# Patient Record
Sex: FEMALE | Race: WHITE | NOT HISPANIC OR LATINO | Employment: FULL TIME | URBAN - METROPOLITAN AREA
[De-identification: names, ages, dates, MRNs, and addresses within clinical notes are randomized per-mention and may not be internally consistent; named-entity substitution may affect disease eponyms.]

---

## 2019-01-29 ENCOUNTER — HISTORICAL (OUTPATIENT)
Dept: ADMINISTRATIVE | Facility: HOSPITAL | Age: 29
End: 2019-01-29

## 2019-01-29 LAB
ABS NEUT (OLG): 3.6 X10(3)/MCL (ref 2.1–9.2)
ALBUMIN SERPL-MCNC: 4.1 GM/DL (ref 3.4–5)
ALBUMIN/GLOB SERPL: 1.78 {RATIO} (ref 1.5–2.5)
ALP SERPL-CCNC: 56 UNIT/L (ref 38–126)
ALT SERPL-CCNC: 12 UNIT/L (ref 7–52)
APPEARANCE, UA: CLEAR
AST SERPL-CCNC: 16 UNIT/L (ref 15–37)
BACTERIA #/AREA URNS AUTO: NORMAL /HPF
BILIRUB SERPL-MCNC: 0.3 MG/DL (ref 0.2–1)
BILIRUB UR QL STRIP: NEGATIVE MG/DL
BILIRUBIN DIRECT+TOT PNL SERPL-MCNC: 0.1 MG/DL (ref 0–0.5)
BILIRUBIN DIRECT+TOT PNL SERPL-MCNC: 0.2 MG/DL
BUN SERPL-MCNC: 7 MG/DL (ref 7–18)
CALCIUM SERPL-MCNC: 8.4 MG/DL (ref 8.5–10)
CHLORIDE SERPL-SCNC: 96 MMOL/L (ref 98–107)
CO2 SERPL-SCNC: 29 MMOL/L (ref 21–32)
COLOR UR: YELLOW
CREAT SERPL-MCNC: 0.65 MG/DL (ref 0.6–1.3)
ERYTHROCYTE [DISTWIDTH] IN BLOOD BY AUTOMATED COUNT: 12.3 % (ref 11.5–17)
GLOBULIN SER-MCNC: 2.3 GM/DL (ref 1.2–3)
GLUCOSE (UA): NEGATIVE MG/DL
GLUCOSE SERPL-MCNC: 85 MG/DL (ref 74–106)
HCT VFR BLD AUTO: 39.8 % (ref 37–47)
HGB BLD-MCNC: 13.1 GM/DL (ref 12–16)
HGB UR QL STRIP: NEGATIVE UNIT/L
KETONES UR QL STRIP: NEGATIVE MG/DL
LEUKOCYTE ESTERASE UR QL STRIP: NEGATIVE UNIT/L
LYMPHOCYTES # BLD AUTO: 2 X10(3)/MCL (ref 0.6–3.4)
LYMPHOCYTES NFR BLD AUTO: 32.3 % (ref 13–40)
MCH RBC QN AUTO: 32.3 PG (ref 27–31.2)
MCHC RBC AUTO-ENTMCNC: 33 GM/DL (ref 32–36)
MCV RBC AUTO: 98 FL (ref 80–94)
MONOCYTES # BLD AUTO: 0.6 X10(3)/MCL (ref 0.1–1.3)
MONOCYTES NFR BLD AUTO: 9 % (ref 0.1–24)
NEUTROPHILS NFR BLD AUTO: 58.7 % (ref 47–80)
NITRITE UR QL STRIP.AUTO: NEGATIVE
PH UR STRIP: 7.5 [PH]
PLATELET # BLD AUTO: 262 X10(3)/MCL (ref 130–400)
PMV BLD AUTO: 8.1 FL (ref 9.4–12.4)
POTASSIUM SERPL-SCNC: 3.7 MMOL/L (ref 3.5–5.1)
PROT SERPL-MCNC: 6.4 GM/DL (ref 6.4–8.2)
PROT UR QL STRIP: NEGATIVE MG/DL
RBC # BLD AUTO: 4.05 X10(6)/MCL (ref 4.2–5.4)
RBC #/AREA URNS HPF: NORMAL /HPF
SODIUM SERPL-SCNC: 128 MMOL/L (ref 136–145)
SP GR UR STRIP: 1.01
SQUAMOUS EPITHELIAL, UA: NORMAL /LPF
UROBILINOGEN UR STRIP-ACNC: 0.2 MG/DL
WBC # SPEC AUTO: 6.2 X10(3)/MCL (ref 4.5–11.5)
WBC #/AREA URNS AUTO: NORMAL /[HPF]

## 2019-02-20 ENCOUNTER — HISTORICAL (OUTPATIENT)
Dept: ADMINISTRATIVE | Facility: HOSPITAL | Age: 29
End: 2019-02-20

## 2019-02-20 LAB — H PYLORI AB SER IA-ACNC: NEGATIVE

## 2019-05-06 ENCOUNTER — HISTORICAL (OUTPATIENT)
Dept: LAB | Facility: HOSPITAL | Age: 29
End: 2019-05-06

## 2019-05-22 ENCOUNTER — HISTORICAL (OUTPATIENT)
Dept: ADMINISTRATIVE | Facility: HOSPITAL | Age: 29
End: 2019-05-22

## 2019-05-22 LAB
ABS NEUT (OLG): 6.23 X10(3)/MCL (ref 2.1–9.2)
BASOPHILS # BLD AUTO: 0 X10(3)/MCL (ref 0–0.2)
BASOPHILS NFR BLD AUTO: 0 %
EOSINOPHIL # BLD AUTO: 0 X10(3)/MCL (ref 0–0.9)
EOSINOPHIL NFR BLD AUTO: 0 %
ERYTHROCYTE [DISTWIDTH] IN BLOOD BY AUTOMATED COUNT: 11.8 % (ref 11.5–17)
GROUP & RH: NORMAL
HBV SURFACE AG SERPL QL IA: NEGATIVE
HCT VFR BLD AUTO: 40.7 % (ref 37–47)
HGB BLD-MCNC: 13.5 GM/DL (ref 12–16)
HIV 1+2 AB+HIV1 P24 AG SERPL QL IA: NEGATIVE
LYMPHOCYTES # BLD AUTO: 1.6 X10(3)/MCL (ref 0.6–4.6)
LYMPHOCYTES NFR BLD AUTO: 19 %
MCH RBC QN AUTO: 31.2 PG (ref 27–31)
MCHC RBC AUTO-ENTMCNC: 33.2 GM/DL (ref 33–36)
MCV RBC AUTO: 94 FL (ref 80–94)
MONOCYTES # BLD AUTO: 0.6 X10(3)/MCL (ref 0.1–1.3)
MONOCYTES NFR BLD AUTO: 7 %
NEUTROPHILS # BLD AUTO: 6.23 X10(3)/MCL (ref 2.1–9.2)
NEUTROPHILS NFR BLD AUTO: 73 %
PLATELET # BLD AUTO: 251 X10(3)/MCL (ref 130–400)
PMV BLD AUTO: 9 FL (ref 9.4–12.4)
RBC # BLD AUTO: 4.33 X10(6)/MCL (ref 4.2–5.4)
T PALLIDUM AB SER QL: NORMAL
WBC # SPEC AUTO: 8.6 X10(3)/MCL (ref 4.5–11.5)

## 2019-05-24 LAB — FINAL CULTURE: NO GROWTH

## 2019-08-12 ENCOUNTER — HISTORICAL (OUTPATIENT)
Dept: ADMINISTRATIVE | Facility: HOSPITAL | Age: 29
End: 2019-08-12

## 2019-08-12 LAB
ERYTHROCYTE [DISTWIDTH] IN BLOOD BY AUTOMATED COUNT: 12.9 % (ref 11.5–17)
GLUCOSE 1H P 100 G GLC PO SERPL-MCNC: 105 MG/DL (ref 100–180)
HCT VFR BLD AUTO: 38.6 % (ref 37–47)
HGB BLD-MCNC: 12.8 GM/DL (ref 12–16)
MCH RBC QN AUTO: 31.7 PG (ref 27–31)
MCHC RBC AUTO-ENTMCNC: 33.2 GM/DL (ref 33–36)
MCV RBC AUTO: 95.5 FL (ref 80–94)
PLATELET # BLD AUTO: 226 X10(3)/MCL (ref 130–400)
PMV BLD AUTO: 9.1 FL (ref 9.4–12.4)
PRODUCT READY: NORMAL
RBC # BLD AUTO: 4.04 X10(6)/MCL (ref 4.2–5.4)
WBC # SPEC AUTO: 7.3 X10(3)/MCL (ref 4.5–11.5)

## 2019-11-05 ENCOUNTER — HISTORICAL (OUTPATIENT)
Dept: LAB | Facility: HOSPITAL | Age: 29
End: 2019-11-05

## 2019-11-07 LAB — FINAL CULTURE: NORMAL

## 2020-04-29 LAB
PAP RECOMMENDATION EXT: NORMAL
PAP SMEAR: NORMAL

## 2022-04-07 ENCOUNTER — HISTORICAL (OUTPATIENT)
Dept: ADMINISTRATIVE | Facility: HOSPITAL | Age: 32
End: 2022-04-07

## 2022-04-24 VITALS
WEIGHT: 140.44 LBS | DIASTOLIC BLOOD PRESSURE: 70 MMHG | HEIGHT: 63 IN | BODY MASS INDEX: 24.88 KG/M2 | SYSTOLIC BLOOD PRESSURE: 106 MMHG

## 2022-09-15 ENCOUNTER — DOCUMENTATION ONLY (OUTPATIENT)
Dept: ADMINISTRATIVE | Facility: HOSPITAL | Age: 32
End: 2022-09-15

## 2023-09-20 PROBLEM — F41.9 ANXIETY AND DEPRESSION: Status: ACTIVE | Noted: 2023-09-20

## 2023-09-20 PROBLEM — F32.A ANXIETY AND DEPRESSION: Status: ACTIVE | Noted: 2023-09-20

## 2023-10-31 PROBLEM — F33.9 DEPRESSION, RECURRENT: Status: ACTIVE | Noted: 2023-10-31

## 2024-11-13 ENCOUNTER — OFFICE VISIT (OUTPATIENT)
Dept: PRIMARY CARE CLINIC | Facility: CLINIC | Age: 34
End: 2024-11-13
Payer: COMMERCIAL

## 2024-11-13 DIAGNOSIS — Z00.00 WELLNESS EXAMINATION: Chronic | ICD-10-CM

## 2024-11-13 DIAGNOSIS — F33.9 DEPRESSION, RECURRENT: Chronic | ICD-10-CM

## 2024-11-13 DIAGNOSIS — R50.9 LOW GRADE FEVER: ICD-10-CM

## 2024-11-13 DIAGNOSIS — R53.83 FATIGUE, UNSPECIFIED TYPE: ICD-10-CM

## 2024-11-13 DIAGNOSIS — Z76.89 ENCOUNTER TO ESTABLISH CARE WITH NEW DOCTOR: Primary | ICD-10-CM

## 2024-11-13 DIAGNOSIS — L65.9 HAIR LOSS: ICD-10-CM

## 2024-11-13 PROBLEM — F41.9 ANXIETY AND DEPRESSION: Status: RESOLVED | Noted: 2023-09-20 | Resolved: 2024-11-13

## 2024-11-13 PROBLEM — F32.A ANXIETY AND DEPRESSION: Status: RESOLVED | Noted: 2023-09-20 | Resolved: 2024-11-13

## 2024-11-13 PROCEDURE — 99204 OFFICE O/P NEW MOD 45 MIN: CPT | Mod: ,,, | Performed by: STUDENT IN AN ORGANIZED HEALTH CARE EDUCATION/TRAINING PROGRAM

## 2024-11-13 PROCEDURE — 1160F RVW MEDS BY RX/DR IN RCRD: CPT | Mod: CPTII,,, | Performed by: STUDENT IN AN ORGANIZED HEALTH CARE EDUCATION/TRAINING PROGRAM

## 2024-11-13 PROCEDURE — 1159F MED LIST DOCD IN RCRD: CPT | Mod: CPTII,,, | Performed by: STUDENT IN AN ORGANIZED HEALTH CARE EDUCATION/TRAINING PROGRAM

## 2024-11-13 RX ORDER — SERTRALINE HYDROCHLORIDE 50 MG/1
75 TABLET, FILM COATED ORAL DAILY
Qty: 135 TABLET | Refills: 3 | Status: SHIPPED | OUTPATIENT
Start: 2024-11-13 | End: 2025-11-13

## 2024-11-13 RX ORDER — SERTRALINE HYDROCHLORIDE 50 MG/1
75 TABLET, FILM COATED ORAL DAILY
Qty: 135 TABLET | Refills: 3 | Status: SHIPPED | OUTPATIENT
Start: 2024-11-13 | End: 2024-11-13

## 2024-11-13 NOTE — PROGRESS NOTES
Subjective:       Patient ID: India Medina is a 34 y.o. female.    -------------------------------------    Abdominal pain    Anxiety disorder, unspecified    Rh negative state in antepartum period      Chief Complaint: Establish Care    Presents to the clinic to establish care. Due for wellness visit. Overall been healthy. However for these past two weeks have been not feeling too well. Having pelvic pain,radiating to rectum, lower back and into the legs. Been having muscle aches and low grade fever. No dysuria, hematuria, urinary frequency/urgency. Reached out to OBGYN but was unable to be seen so was treated prophylactic or UTI with Macrobid. Denies constipation, bloody stools and pain defecation. Had a bout of diarrhea/loose stools yesterday. History of hemorrhoids but this was different. Pelvic pain and rectal issues resolved with antibiotics. Daughter was sick 1.5 week ago and now her son is sick as well. Both had sinus issues/upper respiratory symptoms with fever.       Review of Systems   Constitutional:  Positive for fatigue and fever.   HENT:  Negative for nasal congestion, rhinorrhea and sinus pressure/congestion.    Respiratory:  Negative for cough.    Cardiovascular:  Negative for chest pain and leg swelling.   Gastrointestinal:  Positive for abdominal pain and rectal pain.   Genitourinary:  Positive for pelvic pain. Negative for dysuria, frequency, hematuria and urgency.   Musculoskeletal:  Positive for myalgias.   Psychiatric/Behavioral:  Negative for depressed mood.          Objective:      Physical Exam  Vitals and nursing note reviewed.   Constitutional:       General: She is not in acute distress.     Appearance: Normal appearance. She is not ill-appearing.   HENT:      Right Ear: Tympanic membrane and external ear normal. No middle ear effusion. Tympanic membrane is not erythematous.      Left Ear: Tympanic membrane and external ear normal.  No middle ear effusion. Tympanic membrane is not  erythematous.      Nose: Congestion and rhinorrhea present.      Right Turbinates: Enlarged and swollen.      Left Turbinates: Enlarged and swollen.      Mouth/Throat:      Mouth: Mucous membranes are moist.      Pharynx: No posterior oropharyngeal erythema.   Eyes:      General: No scleral icterus.     Conjunctiva/sclera: Conjunctivae normal.   Cardiovascular:      Rate and Rhythm: Normal rate and regular rhythm.      Pulses: Normal pulses.      Heart sounds: Normal heart sounds. No murmur heard.  Pulmonary:      Effort: Pulmonary effort is normal. No respiratory distress.      Breath sounds: Normal breath sounds. No wheezing.   Abdominal:      Palpations: Abdomen is soft.      Tenderness: There is no abdominal tenderness.   Musculoskeletal:         General: No deformity.      Right lower leg: No edema.      Left lower leg: No edema.   Skin:     General: Skin is warm.      Coloration: Skin is not jaundiced.   Neurological:      Mental Status: She is alert. Mental status is at baseline.      Gait: Gait normal.   Psychiatric:         Mood and Affect: Mood normal.         Behavior: Behavior normal.         Assessment & Plan:   1. Encounter to establish care with new doctor  Comments:  Reviewed medical history and reconciled medications   Ordered Wellness Labs   Requested records from previous provider/specialists  Orders:  -     CBC Auto Differential; Future; Expected date: 11/13/2024  -     Comprehensive Metabolic Panel; Future; Expected date: 11/13/2024  -     TSH; Future; Expected date: 11/13/2024  -     Lipid Panel; Future; Expected date: 11/13/2024  -     Urinalysis; Future; Expected date: 11/13/2024  -     Hemoglobin A1C; Future; Expected date: 11/13/2024  -     Vitamin D; Future; Expected date: 11/13/2024  -     T4, Free; Future; Expected date: 11/13/2024  -     Iron and TIBC; Future; Expected date: 11/13/2024  -     Vitamin B12; Future; Expected date: 11/13/2024  -     Folate; Future; Expected date:  11/13/2024  -     Ferritin; Future; Expected date: 11/13/2024    2. Depression, recurrent  Assessment & Plan:  Stable, improved   Continue Sertraline, refilled  Encouraged self coping mechanisms (deep breathing, exercise, yoga, meditation, etc)       Orders:  -     CBC Auto Differential; Future; Expected date: 11/13/2024  -     Comprehensive Metabolic Panel; Future; Expected date: 11/13/2024  -     TSH; Future; Expected date: 11/13/2024  -     Lipid Panel; Future; Expected date: 11/13/2024  -     Urinalysis; Future; Expected date: 11/13/2024  -     Hemoglobin A1C; Future; Expected date: 11/13/2024  -     sertraline (ZOLOFT) 50 MG tablet; Take 1.5 tablets (75 mg total) by mouth once daily.  Dispense: 135 tablet; Refill: 3    3. Wellness examination  -     CBC Auto Differential; Future; Expected date: 11/13/2024  -     Comprehensive Metabolic Panel; Future; Expected date: 11/13/2024  -     TSH; Future; Expected date: 11/13/2024  -     Lipid Panel; Future; Expected date: 11/13/2024  -     Urinalysis; Future; Expected date: 11/13/2024  -     Hemoglobin A1C; Future; Expected date: 11/13/2024  -     Vitamin D; Future; Expected date: 11/13/2024  -     T4, Free; Future; Expected date: 11/13/2024  -     Iron and TIBC; Future; Expected date: 11/13/2024  -     Vitamin B12; Future; Expected date: 11/13/2024  -     Folate; Future; Expected date: 11/13/2024  -     Ferritin; Future; Expected date: 11/13/2024    4. Low grade fever  Comments:  Symptom improving, 2 weeks   POC COVID - likely viral given children symptoms as well   If flu, not a candidate for tamiflu given symptom onset >48-72 hours  Orders:  -     SARS Coronavirus 2 Antigen, POCT Manual Read    5. Fatigue, unspecified type  -     TSH; Future; Expected date: 11/13/2024  -     Vitamin D; Future; Expected date: 11/13/2024  -     T4, Free; Future; Expected date: 11/13/2024  -     Iron and TIBC; Future; Expected date: 11/13/2024  -     Vitamin B12; Future; Expected date:  11/13/2024  -     Folate; Future; Expected date: 11/13/2024  -     Ferritin; Future; Expected date: 11/13/2024  -     BEL Screen IFA; Future; Expected date: 11/13/2024    6. Hair loss  -     TSH; Future; Expected date: 11/13/2024  -     Vitamin D; Future; Expected date: 11/13/2024  -     T4, Free; Future; Expected date: 11/13/2024  -     Iron and TIBC; Future; Expected date: 11/13/2024  -     Vitamin B12; Future; Expected date: 11/13/2024  -     Folate; Future; Expected date: 11/13/2024  -     Ferritin; Future; Expected date: 11/13/2024  -     BEL Screen IFA; Future; Expected date: 11/13/2024    Follow up in about 2 weeks (around 11/27/2024) for Wellness. In addition to their scheduled follow up, the patient has also been instructed to follow up on as needed basis.

## 2024-11-13 NOTE — ASSESSMENT & PLAN NOTE
Stable, improved   Continue Sertraline, refilled  Encouraged self coping mechanisms (deep breathing, exercise, yoga, meditation, etc)

## 2024-11-14 ENCOUNTER — LAB VISIT (OUTPATIENT)
Dept: LAB | Facility: HOSPITAL | Age: 34
End: 2024-11-14
Attending: STUDENT IN AN ORGANIZED HEALTH CARE EDUCATION/TRAINING PROGRAM
Payer: COMMERCIAL

## 2024-11-14 DIAGNOSIS — L65.9 HAIR LOSS: ICD-10-CM

## 2024-11-14 DIAGNOSIS — Z76.89 ENCOUNTER TO ESTABLISH CARE WITH NEW DOCTOR: ICD-10-CM

## 2024-11-14 DIAGNOSIS — R53.83 FATIGUE, UNSPECIFIED TYPE: ICD-10-CM

## 2024-11-14 DIAGNOSIS — Z00.00 WELLNESS EXAMINATION: ICD-10-CM

## 2024-11-14 DIAGNOSIS — F33.9 DEPRESSION, RECURRENT: ICD-10-CM

## 2024-11-14 LAB
25(OH)D3+25(OH)D2 SERPL-MCNC: 28 NG/ML (ref 30–80)
ALBUMIN SERPL-MCNC: 4.1 G/DL (ref 3.5–5)
ALBUMIN/GLOB SERPL: 1.3 RATIO (ref 1.1–2)
ALP SERPL-CCNC: 72 UNIT/L (ref 40–150)
ALT SERPL-CCNC: 5 UNIT/L (ref 0–55)
ANION GAP SERPL CALC-SCNC: 8 MEQ/L
AST SERPL-CCNC: 11 UNIT/L (ref 5–34)
BACTERIA #/AREA URNS AUTO: ABNORMAL /HPF
BASOPHILS # BLD AUTO: 0.03 X10(3)/MCL
BASOPHILS NFR BLD AUTO: 0.5 %
BILIRUB SERPL-MCNC: 0.4 MG/DL
BILIRUB UR QL STRIP.AUTO: NEGATIVE
BUN SERPL-MCNC: 6.6 MG/DL (ref 7–18.7)
CALCIUM SERPL-MCNC: 9.5 MG/DL (ref 8.4–10.2)
CHLORIDE SERPL-SCNC: 106 MMOL/L (ref 98–107)
CHOLEST SERPL-MCNC: 189 MG/DL
CHOLEST/HDLC SERPL: 4 {RATIO} (ref 0–5)
CLARITY UR: CLEAR
CO2 SERPL-SCNC: 26 MMOL/L (ref 22–29)
COLOR UR AUTO: YELLOW
CREAT SERPL-MCNC: 0.72 MG/DL (ref 0.55–1.02)
CREAT/UREA NIT SERPL: 9
EOSINOPHIL # BLD AUTO: 0.33 X10(3)/MCL (ref 0–0.9)
EOSINOPHIL NFR BLD AUTO: 5.7 %
ERYTHROCYTE [DISTWIDTH] IN BLOOD BY AUTOMATED COUNT: 11.9 % (ref 11.5–17)
EST. AVERAGE GLUCOSE BLD GHB EST-MCNC: 88.2 MG/DL
FERRITIN SERPL-MCNC: 176.1 NG/ML (ref 4.63–204)
FOLATE SERPL-MCNC: 9.9 NG/ML (ref 7–31.4)
GFR SERPLBLD CREATININE-BSD FMLA CKD-EPI: >60 ML/MIN/1.73/M2
GLOBULIN SER-MCNC: 3.1 GM/DL (ref 2.4–3.5)
GLUCOSE SERPL-MCNC: 95 MG/DL (ref 74–100)
GLUCOSE UR QL STRIP: NORMAL
HBA1C MFR BLD: 4.7 %
HCT VFR BLD AUTO: 40.5 % (ref 37–47)
HDLC SERPL-MCNC: 49 MG/DL (ref 35–60)
HGB BLD-MCNC: 13.6 G/DL (ref 12–16)
HGB UR QL STRIP: ABNORMAL
IMM GRANULOCYTES # BLD AUTO: 0.02 X10(3)/MCL (ref 0–0.04)
IMM GRANULOCYTES NFR BLD AUTO: 0.3 %
IRON SATN MFR SERPL: 36 % (ref 20–50)
IRON SERPL-MCNC: 87 UG/DL (ref 50–170)
KETONES UR QL STRIP: NEGATIVE
LDLC SERPL CALC-MCNC: 125 MG/DL (ref 50–140)
LEUKOCYTE ESTERASE UR QL STRIP: NEGATIVE
LYMPHOCYTES # BLD AUTO: 1.64 X10(3)/MCL (ref 0.6–4.6)
LYMPHOCYTES NFR BLD AUTO: 28.4 %
MCH RBC QN AUTO: 31.9 PG (ref 27–31)
MCHC RBC AUTO-ENTMCNC: 33.6 G/DL (ref 33–36)
MCV RBC AUTO: 94.8 FL (ref 80–94)
MONOCYTES # BLD AUTO: 0.47 X10(3)/MCL (ref 0.1–1.3)
MONOCYTES NFR BLD AUTO: 8.1 %
MUCOUS THREADS URNS QL MICRO: ABNORMAL /LPF
NEUTROPHILS # BLD AUTO: 3.28 X10(3)/MCL (ref 2.1–9.2)
NEUTROPHILS NFR BLD AUTO: 57 %
NITRITE UR QL STRIP: NEGATIVE
NRBC BLD AUTO-RTO: 0 %
PH UR STRIP: 6.5 [PH]
PLATELET # BLD AUTO: 322 X10(3)/MCL (ref 130–400)
PMV BLD AUTO: 9.3 FL (ref 7.4–10.4)
POTASSIUM SERPL-SCNC: 4.6 MMOL/L (ref 3.5–5.1)
PROT SERPL-MCNC: 7.2 GM/DL (ref 6.4–8.3)
PROT UR QL STRIP: ABNORMAL
RBC # BLD AUTO: 4.27 X10(6)/MCL (ref 4.2–5.4)
RBC #/AREA URNS AUTO: ABNORMAL /HPF
SODIUM SERPL-SCNC: 140 MMOL/L (ref 136–145)
SP GR UR STRIP.AUTO: 1.02 (ref 1–1.03)
SQUAMOUS #/AREA URNS LPF: ABNORMAL /HPF
T4 FREE SERPL-MCNC: 1.05 NG/DL (ref 0.7–1.48)
TIBC SERPL-MCNC: 158 UG/DL (ref 70–310)
TIBC SERPL-MCNC: 245 UG/DL (ref 250–450)
TRANSFERRIN SERPL-MCNC: 209 MG/DL (ref 180–382)
TRIGL SERPL-MCNC: 77 MG/DL (ref 37–140)
TSH SERPL-ACNC: 1.27 UIU/ML (ref 0.35–4.94)
UROBILINOGEN UR STRIP-ACNC: NORMAL
VIT B12 SERPL-MCNC: 1050 PG/ML (ref 213–816)
VLDLC SERPL CALC-MCNC: 15 MG/DL
WBC # BLD AUTO: 5.77 X10(3)/MCL (ref 4.5–11.5)
WBC #/AREA URNS AUTO: ABNORMAL /HPF

## 2024-11-14 PROCEDURE — 82306 VITAMIN D 25 HYDROXY: CPT

## 2024-11-14 PROCEDURE — 84443 ASSAY THYROID STIM HORMONE: CPT

## 2024-11-14 PROCEDURE — 80061 LIPID PANEL: CPT

## 2024-11-14 PROCEDURE — 80053 COMPREHEN METABOLIC PANEL: CPT

## 2024-11-14 PROCEDURE — 81015 MICROSCOPIC EXAM OF URINE: CPT

## 2024-11-14 PROCEDURE — 82728 ASSAY OF FERRITIN: CPT

## 2024-11-14 PROCEDURE — 82746 ASSAY OF FOLIC ACID SERUM: CPT

## 2024-11-14 PROCEDURE — 86039 ANTINUCLEAR ANTIBODIES (ANA): CPT

## 2024-11-14 PROCEDURE — 83036 HEMOGLOBIN GLYCOSYLATED A1C: CPT

## 2024-11-14 PROCEDURE — 83540 ASSAY OF IRON: CPT

## 2024-11-14 PROCEDURE — 82607 VITAMIN B-12: CPT

## 2024-11-14 PROCEDURE — 36415 COLL VENOUS BLD VENIPUNCTURE: CPT

## 2024-11-14 PROCEDURE — 84439 ASSAY OF FREE THYROXINE: CPT

## 2024-11-14 PROCEDURE — 85025 COMPLETE CBC W/AUTO DIFF WBC: CPT

## 2024-11-15 LAB — ANA SER QL HEP2 SUBST: NORMAL

## 2024-11-21 ENCOUNTER — OFFICE VISIT (OUTPATIENT)
Dept: PRIMARY CARE CLINIC | Facility: CLINIC | Age: 34
End: 2024-11-21
Payer: COMMERCIAL

## 2024-11-21 VITALS
DIASTOLIC BLOOD PRESSURE: 74 MMHG | HEIGHT: 62 IN | OXYGEN SATURATION: 97 % | SYSTOLIC BLOOD PRESSURE: 113 MMHG | TEMPERATURE: 98 F | HEART RATE: 92 BPM | WEIGHT: 132 LBS | BODY MASS INDEX: 24.29 KG/M2 | RESPIRATION RATE: 20 BRPM

## 2024-11-21 DIAGNOSIS — Z00.00 WELLNESS EXAMINATION: Primary | ICD-10-CM

## 2024-11-21 PROCEDURE — 3078F DIAST BP <80 MM HG: CPT | Mod: CPTII,,, | Performed by: STUDENT IN AN ORGANIZED HEALTH CARE EDUCATION/TRAINING PROGRAM

## 2024-11-21 PROCEDURE — 3008F BODY MASS INDEX DOCD: CPT | Mod: CPTII,,, | Performed by: STUDENT IN AN ORGANIZED HEALTH CARE EDUCATION/TRAINING PROGRAM

## 2024-11-21 PROCEDURE — 1159F MED LIST DOCD IN RCRD: CPT | Mod: CPTII,,, | Performed by: STUDENT IN AN ORGANIZED HEALTH CARE EDUCATION/TRAINING PROGRAM

## 2024-11-21 PROCEDURE — 3074F SYST BP LT 130 MM HG: CPT | Mod: CPTII,,, | Performed by: STUDENT IN AN ORGANIZED HEALTH CARE EDUCATION/TRAINING PROGRAM

## 2024-11-21 PROCEDURE — 99395 PREV VISIT EST AGE 18-39: CPT | Mod: ,,, | Performed by: STUDENT IN AN ORGANIZED HEALTH CARE EDUCATION/TRAINING PROGRAM

## 2024-11-21 PROCEDURE — 3044F HG A1C LEVEL LT 7.0%: CPT | Mod: CPTII,,, | Performed by: STUDENT IN AN ORGANIZED HEALTH CARE EDUCATION/TRAINING PROGRAM

## 2024-11-21 PROCEDURE — 1160F RVW MEDS BY RX/DR IN RCRD: CPT | Mod: CPTII,,, | Performed by: STUDENT IN AN ORGANIZED HEALTH CARE EDUCATION/TRAINING PROGRAM

## 2024-11-21 NOTE — ASSESSMENT & PLAN NOTE
Health Maintenance:  Routine Health Maintenance   Exercise as tolerated, >30 minutes a day for 3-5 days a week.  Counseled patient on compliance with medications and healthy diet.     Age-Appropriate Screening  Vaccinations:    - Flu: Postponed, patient's preference   - Pneumonia: N/A   - Shingles (>50):N/A   - Tdap: UTD, due 2029   - COVID: Discontinued, patient's preference    Screening:   - Pap Smear (21-65): UTD, due 2026   - Mammogram (40-50 discuss w/ pt, all >50): N/A   - Osteoporosis (all>65, sooner if risk factors): N/A   - Lung Ca. Screening (50-80 if >20 pack yrs current or quit <15 yrs): N/A   - Colon Ca. Screening (age >45): N/A   - Hep. C, HIV: Negative

## 2024-11-21 NOTE — PROGRESS NOTES
ANNUAL WELLNESS NOTE    Chief Complaint:  No chief complaint on file.     HPI:  India Medina is a 34 y.o. female    -------------------------------------    Abdominal pain    Anxiety disorder, unspecified    Rh negative state in antepartum period     Patient Care Team:  Nikole Arnett MD as PCP - General (Internal Medicine)  Ady Gaspar MD as Consulting Physician (Obstetrics and Gynecology)    Presents today for wellness visit. Describes overall health as good. Diet is balanced. Exercises rarely. Tobacco use: never. Alcohol use: <5 drinks/wk. Caffeine intake: 2-3 caffeinated drinks daily. Eye exam: never (denies vision problems). Dental exam: twice annually. Reviewed labs, answered all questions and concerns at this time.     Review of Systems   Constitutional:  Negative for chills and fever.   Respiratory:  Negative for cough and shortness of breath.    Cardiovascular:  Negative for chest pain and leg swelling.   Gastrointestinal:  Negative for abdominal pain, diarrhea, nausea and vomiting.   Genitourinary:  Negative for dysuria and hematuria.   Psychiatric/Behavioral:  Negative for dysphoric mood and sleep disturbance. The patient is not nervous/anxious.        Physical Exam  Vitals and nursing note reviewed.   Constitutional:       General: She is not in acute distress.     Appearance: Normal appearance. She is not ill-appearing.   HENT:      Head: Normocephalic.      Nose: Nose normal. No rhinorrhea.      Mouth/Throat:      Mouth: Mucous membranes are moist.   Eyes:      General: No scleral icterus.     Conjunctiva/sclera: Conjunctivae normal.   Cardiovascular:      Rate and Rhythm: Normal rate and regular rhythm.   Pulmonary:      Effort: Pulmonary effort is normal. No respiratory distress.   Musculoskeletal:         General: No deformity.   Skin:     General: Skin is warm and dry.      Coloration: Skin is not jaundiced.   Neurological:      Mental Status: She is alert and oriented to  person, place, and time. Mental status is at baseline.      Gait: Gait normal.   Psychiatric:         Mood and Affect: Mood normal.         Behavior: Behavior normal.       Assessment/Plan:  1. Wellness examination  Assessment & Plan:  Health Maintenance:  Routine Health Maintenance   Exercise as tolerated, >30 minutes a day for 3-5 days a week.  Counseled patient on compliance with medications and healthy diet.     Age-Appropriate Screening  Vaccinations:    - Flu: Postponed, patient's preference   - Pneumonia: N/A   - Shingles (>50):N/A   - Tdap: UTD, due 2029   - COVID: Discontinued, patient's preference    Screening:   - Pap Smear (21-65): UTD, due 2026   - Mammogram (40-50 discuss w/ pt, all >50): N/A   - Osteoporosis (all>65, sooner if risk factors): N/A   - Lung Ca. Screening (50-80 if >20 pack yrs current or quit <15 yrs): N/A   - Colon Ca. Screening (age >45): N/A   - Hep. C, HIV: Negative      Follow up in about 1 year (around 11/21/2025) for Wellness.

## 2025-01-24 DIAGNOSIS — F33.9 DEPRESSION, RECURRENT: Chronic | ICD-10-CM

## 2025-01-24 RX ORDER — SERTRALINE HYDROCHLORIDE 50 MG/1
75 TABLET, FILM COATED ORAL DAILY
Qty: 135 TABLET | Refills: 3 | Status: SHIPPED | OUTPATIENT
Start: 2025-01-24 | End: 2026-01-24

## 2025-04-21 ENCOUNTER — DOCUMENTATION ONLY (OUTPATIENT)
Dept: PRIMARY CARE CLINIC | Facility: CLINIC | Age: 35
End: 2025-04-21
Payer: COMMERCIAL

## 2025-04-21 LAB — CRC RECOMMENDATION EXT: NORMAL

## 2025-07-07 ENCOUNTER — TELEPHONE (OUTPATIENT)
Dept: FAMILY MEDICINE | Facility: CLINIC | Age: 35
End: 2025-07-07
Payer: COMMERCIAL

## 2025-07-07 NOTE — TELEPHONE ENCOUNTER
Copied from CRM #[id number]. Topic: Appointments - Appointment Scheduling  >> Jul 7, 2025 12:37 PM Lizbeth wrote:  Type:  Sooner Apoointment Request    Caller is requesting a sooner appointment.  Caller declined first available appointment listed below.  Caller will not accept being placed on the waitlist and is requesting a message be sent to doctor.    Name of Caller: pt    When is the first available appointment? September    Symptoms: sore throat, possible strep, swollen lymph node    Would the patient rather a call back or a response via [facility]?  Phone call    Best Call Back Number: [phone]    Additional Information:  sooner appt please

## 2025-07-09 ENCOUNTER — OFFICE VISIT (OUTPATIENT)
Dept: PRIMARY CARE CLINIC | Facility: CLINIC | Age: 35
End: 2025-07-09
Payer: COMMERCIAL

## 2025-07-09 VITALS
TEMPERATURE: 99 F | HEART RATE: 95 BPM | RESPIRATION RATE: 16 BRPM | OXYGEN SATURATION: 97 % | DIASTOLIC BLOOD PRESSURE: 73 MMHG | SYSTOLIC BLOOD PRESSURE: 111 MMHG | WEIGHT: 135 LBS | BODY MASS INDEX: 24.84 KG/M2 | HEIGHT: 62 IN

## 2025-07-09 DIAGNOSIS — J01.00 ACUTE NON-RECURRENT MAXILLARY SINUSITIS: Primary | ICD-10-CM

## 2025-07-09 DIAGNOSIS — R59.0 CERVICAL LYMPHADENOPATHY: ICD-10-CM

## 2025-07-09 PROCEDURE — 1160F RVW MEDS BY RX/DR IN RCRD: CPT | Mod: CPTII,,, | Performed by: NURSE PRACTITIONER

## 2025-07-09 PROCEDURE — 1159F MED LIST DOCD IN RCRD: CPT | Mod: CPTII,,, | Performed by: NURSE PRACTITIONER

## 2025-07-09 PROCEDURE — 3078F DIAST BP <80 MM HG: CPT | Mod: CPTII,,, | Performed by: NURSE PRACTITIONER

## 2025-07-09 PROCEDURE — 3074F SYST BP LT 130 MM HG: CPT | Mod: CPTII,,, | Performed by: NURSE PRACTITIONER

## 2025-07-09 PROCEDURE — 99214 OFFICE O/P EST MOD 30 MIN: CPT | Mod: ,,, | Performed by: NURSE PRACTITIONER

## 2025-07-09 PROCEDURE — 3008F BODY MASS INDEX DOCD: CPT | Mod: CPTII,,, | Performed by: NURSE PRACTITIONER

## 2025-07-09 RX ORDER — CHOLECALCIFEROL (VITAMIN D3) 25 MCG
1000 TABLET ORAL DAILY
COMMUNITY

## 2025-07-09 RX ORDER — AMOXICILLIN AND CLAVULANATE POTASSIUM 875; 125 MG/1; MG/1
1 TABLET, FILM COATED ORAL EVERY 12 HOURS
Qty: 14 TABLET | Refills: 0 | Status: SHIPPED | OUTPATIENT
Start: 2025-07-09 | End: 2025-07-16

## 2025-07-09 NOTE — PROGRESS NOTES
Internal Medicine    Patient ID: [id number]     Chief Complaint: Sore Throat and Neck Pain    HPI:     India Medina is a 34 y.o. female here today for a clinic visit.     History of Present Illness    CHIEF COMPLAINT:  Patient presents today with persistent sore throat and neck swelling.    HISTORY OF PRESENT ILLNESS:  She reports initial left neck swelling and significant throat pain starting three weeks ago. Symptoms have been cyclical, with neck swelling initially resolving then recurring. She developed upper respiratory congestion over the last two weeks, which she notes is a typical seasonal occurrence during summer months. She reports facial and sinus pain, characterized by shooting pain around eyes and sinus area, along with localized jaw tenderness. She has experienced intermittent low-grade fever around 99°F. She had nasal discharge for two weeks which has now resolved. Pain remains constant without improvement. She denies painful swallowing, body aches, chills, or significant temperature fluctuations. She denies recent exposure to sick individuals. She notes tenderness in lymph node area and overall facial discomfort. This is her first significant illness since starting a new work environment in December.    MEDICAL HISTORY:  Status post tonsillectomy. History of seasonal congestion occurring annually during summer months.    MEDICATIONS:  Last antibiotic use was approximately one year ago. Currently not taking any antibiotics.    ALLERGIES:  Denies any known allergies to medications, antibiotics, or other substances.          Past Medical History:   Diagnosis Date    Abdominal pain     Anxiety disorder, unspecified     Rh negative state in antepartum period         Past Surgical History:   Procedure Laterality Date    AUGMENTATION OF BREAST      TONSILLECTOMY          Social History     Tobacco Use    Smoking status: Never    Smokeless tobacco: Never   Substance and Sexual Activity    Alcohol use:  "Not Currently    Drug use: Never    Sexual activity: Not Currently        Current Outpatient Medications   Medication Instructions    amoxicillin-clavulanate 875-125mg (AUGMENTIN) 875-125 mg per tablet 1 tablet, Oral, Every 12 hours    sertraline (ZOLOFT) 75 mg, Oral, Daily    vitamin D (VITAMIN D3) 1,000 Units, Daily    ZINC ACETATE ORAL Take by mouth.       Review of patient's allergies indicates:   Allergen Reactions    Wellbutrin [bupropion hcl] Rash        Patient Care Team:  Nikole Arnett MD as PCP - General (Internal Medicine)  Ady Gaspar MD as Consulting Physician (Obstetrics and Gynecology)     Subjective:     Review of Systems    12 point review of systems conducted, negative except as stated in the history of present illness. See HPI for details.    Objective:     Visit Vitals  /73   Pulse 95   Temp 98.6 °F (37 °C) (Oral)   Resp 16   Ht 5' 2" (1.575 m)   Wt 61.2 kg (135 lb)   SpO2 97%   BMI 24.69 kg/m²       Physical Exam    Physical Exam    General: No acute distress. Well-developed. Well-nourished.  Eyes: EOMI. Sclerae anicteric.  HENT: Normocephalic. Atraumatic. Nares patent. Moist oral mucosa. Post nasal drainage, clear and thin.  Ears: Bilateral TMs clear. Bilateral EACs clear.  Cardiovascular: Regular rate. Regular rhythm. No murmurs. No rubs. No gallops. Normal S1, S2.  Respiratory: Normal respiratory effort. Clear to auscultation bilaterally. No rales. No rhonchi. No wheezing.  Musculoskeletal: No  obvious deformity.  Extremities: No lower extremity edema.  Neurological: Alert & oriented x3. No slurred speech. Normal gait.  Psychiatric: Normal mood. Normal affect. Good insight. Good judgment.  Skin: Warm. Dry. No rash.  Neck: One enlarged and tender cervical lymph node. No supraclavicular lymph nodes.         Assessment/Plan:     Assessment & Plan    PLAN SUMMARY:  - Prescribed Augmentin twice daily for 1 week with food  - Advised patient about potential antibiotic side " effects (diarrhea)  - Instructed patient to contact office if symptoms worsen or new symptoms develop  - Follow-up scheduled in 2 weeks to re-evaluate lymph node and assess improvement    ACUTE SINUSITIS:  - Patient reports congestion lasting 2 weeks with pain shooting from neck to eyes and sinuses, and maxillary pain.  - Examination revealed post nasal drainage (clear and thin) and no fluid in the left ear canal.  - Prescribed Augmentin to be taken twice daily for 1 week with food.  - Advised that antibiotics may cause diarrhea as a side effect.  - Patient instructed to contact office if symptoms worsen or new symptoms develop.  - Scheduled follow-up that can be cancelled if symptoms completely resolve.    ENLARGED LYMPH NODES:  - Patient presents with swollen neck and tender cervical lymph node, confirmed on exam.  - The prescribed Augmentin will also address the bacterial infection causing lymph node enlargement.  - Follow-up scheduled in 2 weeks to re-evaluate lymph node and assess improvement.    SEASONAL ALLERGIC RHINITIS:  - Patient reports congestion occurring every summer, indicating a seasonal allergic pattern.  - Examination shows post nasal drainage, clear and thin.  - The prescribed Augmentin will also help address symptoms related to the sinuses.           Follow up in about 2 weeks (around 7/23/2025) for Routine Follow Up. In addition to their scheduled follow up, the patient has also been instructed to follow up on as needed basis.     Future Appointments   Date Time Provider Department Center   7/23/2025  9:20 AM Griffin Hughes FNP [facility]   11/26/2025  8:00 AM Nikole Arnett MD [facility] Morgan Louviere, FNP    This note was generated with the assistance of ambient listening technology. Verbal consent was obtained by the patient and accompanying visitor(s) for the recording of patient appointment to facilitate this note. I attest to having  reviewed and edited the generated note for accuracy, though some syntax or spelling errors may persist. Please contact the author of this note for any clarification.